# Patient Record
Sex: FEMALE | Race: WHITE | NOT HISPANIC OR LATINO | URBAN - METROPOLITAN AREA
[De-identification: names, ages, dates, MRNs, and addresses within clinical notes are randomized per-mention and may not be internally consistent; named-entity substitution may affect disease eponyms.]

---

## 2019-04-24 ENCOUNTER — EMERGENCY (EMERGENCY)
Facility: HOSPITAL | Age: 54
LOS: 1 days | Discharge: HOME | End: 2019-04-24
Payer: COMMERCIAL

## 2019-04-24 VITALS
HEART RATE: 63 BPM | SYSTOLIC BLOOD PRESSURE: 115 MMHG | OXYGEN SATURATION: 98 % | RESPIRATION RATE: 18 BRPM | TEMPERATURE: 98 F | DIASTOLIC BLOOD PRESSURE: 63 MMHG

## 2019-04-24 VITALS
RESPIRATION RATE: 20 BRPM | TEMPERATURE: 98 F | DIASTOLIC BLOOD PRESSURE: 82 MMHG | HEART RATE: 79 BPM | WEIGHT: 175.05 LBS | SYSTOLIC BLOOD PRESSURE: 138 MMHG | OXYGEN SATURATION: 99 % | HEIGHT: 62.5 IN

## 2019-04-24 PROCEDURE — 71046 X-RAY EXAM CHEST 2 VIEWS: CPT | Mod: 26

## 2019-04-24 PROCEDURE — 99283 EMERGENCY DEPT VISIT LOW MDM: CPT | Mod: 25

## 2019-04-24 PROCEDURE — 71046 X-RAY EXAM CHEST 2 VIEWS: CPT

## 2019-04-24 NOTE — ED ADULT NURSE NOTE - CHPI ED NUR SYMPTOMS NEG
no coffee grounds emesis/no fever/no nausea/no back pain/no pain/no vaginal discharge/no discharge/no vomiting/no abdominal pain

## 2019-04-24 NOTE — ED PROVIDER NOTE - PHYSICAL EXAMINATION
*Gen: NAD, AAO*3  *HEENT: NC/AT, MMM, airway patent, trachea midline  *CV: RRR, S1/S2 present, no murmurs  *Resp: no respiratory distress, LCTAB, no wheezing  *Breast Exam: Chaperone - Alicja PCA; no overt swelling/redness noted over the breat, no focal TTP  *Abd: non-distended, soft N/Tx4, no guarding or rigidity  *Neuro: no focal neuro deficits, moving all limbs appropriately  *Extremities: no gross deformity  *Skin: no rashes, no wounds   ~ Vi Mckeon M.D.

## 2019-04-24 NOTE — ED ADULT NURSE NOTE - OBJECTIVE STATEMENT
pt c/o "rt breast tenderness and pain to lateral breast under rt axilla x 3 days. No discharge from nipple, no medication taken for the pain." + tenderness to rt lateral rib/breast under axilla on exam pt c/o "rt breast tenderness and pain to lateral breast under rt axilla x 3 days. No discharge from nipple, no medication taken for the pain." + tenderness to rt lateral rib/breast under axilla, no palpable swelling/mass on exam

## 2019-04-24 NOTE — ED PROVIDER NOTE - OBJECTIVE STATEMENT
53 year-old female w/ no pertinent past medical history presents to the ED for pain in her left breast and underneath axilla; ongoing for several days.  Does not mentions any redness overlying the breast; breast discharge.  Reports TTP when she presses over the area.  Has had this in the past when she was menstruating and was b/l - no periods in the past 2 years.  No fevers, chills, nausea, vomiting, chest pain, shortness of breath.  + cough (non-productive) ongoing for a few days - seen at [facility] and given a Z-pack.

## 2019-04-24 NOTE — ED PROVIDER NOTE - CLINICAL SUMMARY MEDICAL DECISION MAKING FREE TEXT BOX
53 year-old female w/ no pertinent past medical history presents to the ED for pain in her left breast; ongoing for several days - no overlying cellulitic changes.  No overt signs of abscess.  PE non-focal.  No acute ER intervention.  CXR to eval for PNA given cough.  Rec. pain control - patient denies at this time.

## 2019-04-24 NOTE — ED PROVIDER NOTE - ATTENDING CONTRIBUTION TO CARE
MD Kelsey:  patient seen and evaluated with the resident.  I was present for key portions of the History & Physical, and I agree with the Impression & Plan.  MD Kelsey:  54 yo F, c/o R lateral breast pain.  Duration 2d.  Quality:  "like there's a mass in there."  Associated Sx:  no f/c, no erythema, no breast swelling, no asymmetry, no axillae pain, no night sweats or weight loss.  Patient very anxious that she has breast CA; wants us to do a mamogram/US and Bx if need be.  VS: wnl.  Physical Exam: adult F, NAD, NCAT, Breast Chaperone = Tech .  Breasts symmetric, nontender, no masses, skin normal, no axillary LAD.  Neuro: AAOx3, ambulates w/o diff, strength 5/5 & symmetric throughout.  Psych - very anxious that she has breast CA.  Impression:  possible cystic R breast change in post-menopausal femal.  Plan:  cxr, anticipate d/c home and f/u outpatient gyn for mamography.

## 2022-02-28 NOTE — ED PROVIDER NOTE - NSFOLLOWUPINSTRUCTIONS_ED_ALL_ED_FT
[Negative] : Breast Follow-up with your OB/GYN within 2 - 5 days for further evaluation.  Please return to the Emergency Department immediately for any new, worsening or concerning symptoms.    Can use Tylenol or Ibuprofen as per package directions for pain - use only as needed.  These are over-the-counter medications - please respect the warnings on the label.